# Patient Record
Sex: FEMALE | Race: WHITE | URBAN - METROPOLITAN AREA
[De-identification: names, ages, dates, MRNs, and addresses within clinical notes are randomized per-mention and may not be internally consistent; named-entity substitution may affect disease eponyms.]

---

## 2018-02-19 ENCOUNTER — OFFICE VISIT (OUTPATIENT)
Dept: PSYCHIATRY | Facility: CLINIC | Age: 28
End: 2018-02-19
Attending: NURSE PRACTITIONER
Payer: COMMERCIAL

## 2018-02-19 VITALS — DIASTOLIC BLOOD PRESSURE: 79 MMHG | SYSTOLIC BLOOD PRESSURE: 118 MMHG | WEIGHT: 134.8 LBS | HEART RATE: 90 BPM

## 2018-02-19 DIAGNOSIS — F60.3 BORDERLINE PERSONALITY DISORDER (H): ICD-10-CM

## 2018-02-19 DIAGNOSIS — F43.23 ADJUSTMENT DISORDER WITH MIXED ANXIETY AND DEPRESSED MOOD: Primary | ICD-10-CM

## 2018-02-19 PROCEDURE — G0463 HOSPITAL OUTPT CLINIC VISIT: HCPCS | Mod: ZF

## 2018-02-19 RX ORDER — QUETIAPINE FUMARATE 25 MG/1
25 TABLET, FILM COATED ORAL AT BEDTIME
Qty: 30 TABLET | Refills: 0 | Status: SHIPPED | OUTPATIENT
Start: 2018-02-19 | End: 2018-03-22

## 2018-02-19 RX ORDER — LAMOTRIGINE 25 MG/1
25 TABLET ORAL DAILY
Qty: 30 TABLET | Refills: 0 | Status: SHIPPED | OUTPATIENT
Start: 2018-02-19 | End: 2018-03-22

## 2018-02-19 ASSESSMENT — PAIN SCALES - GENERAL: PAINLEVEL: NO PAIN (0)
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
## 2018-02-19 NOTE — NURSING NOTE
Chief Complaint   Patient presents with     Eval/Assessment     borderline personality disorder     Reviewed allergies, medications, pharmacy and smoking status.  Administered abuse screening questions     Obtained weight, pain level, blood pressure and heart rate

## 2018-02-19 NOTE — MR AVS SNAPSHOT
After Visit Summary   2018    Viky Plaza    MRN: [id number]           Patient Information     Date Of Birth          1990        Visit Information        Provider Department      2018 7:30 AM Viky Sanchez APRN CNP Psychiatry Clinic        Today's Diagnoses     Adjustment disorder with mixed anxiety and depressed mood    -  1    Borderline personality disorder           Follow-ups after your visit        Follow-up notes from your care team     Return in about 4 weeks (around 3/19/2018), or if symptoms worsen or fail to improve, for Routine Visit.      Your next 10 appointments already scheduled     Mar 22, 2018  9:30 AM CDT   Adult Med Follow UP with TIFFANI Luke CNP   Psychiatry Clinic ([facility])    [place] [id number] [facility] [address] [phone]              Who to contact     Please call your clinic at [phone] to:    Ask questions about your health    Make or cancel appointments    Discuss your medicines    Learn about your test results    Speak to your doctor            Additional Information About Your Visit        MyChart Information     Divergence is an electronic gateway that provides easy, online access to your medical records. With Divergence, you can request a clinic appointment, read your test results, renew a prescription or communicate with your care team.     To sign up for [employer] visit the website at [email or web address]   You will be asked to enter the access code listed below, as well as some personal information. Please follow the directions to create your username and password.     Your access code is: [email or web address]  Expires: 2018  2:13 PM     Your access code will  in 90 days. If you need help or a new code, please contact your [facility] or call [phone] for assistance.        Care EveryWhere ID     This is your Care EveryWhere ID. This  could be used by other organizations to access your [facility] medical records  [id number]        Your Vitals Were     Pulse                   90            Blood Pressure from Last 3 Encounters:   02/19/18 118/79    Weight from Last 3 Encounters:   02/19/18 61.1 kg (134 lb 12.8 oz)              Today, you had the following     No orders found for display         Today's Medication Changes          These changes are accurate as of 2/19/18 11:59 PM.  If you have any questions, ask your nurse or doctor.               Start taking these medicines.        Dose/Directions    lamoTRIgine 25 MG tablet   Commonly known as:  LAMICTAL   Used for:  Adjustment disorder with mixed anxiety and depressed mood, Borderline personality disorder   Started by:  Viky Sanchez APRN CNP        Dose:  25 mg   Take 1 tablet (25 mg) by mouth daily   Quantity:  30 tablet   Refills:  0       QUEtiapine 25 MG tablet   Commonly known as:  SEROquel   Used for:  Adjustment disorder with mixed anxiety and depressed mood, Borderline personality disorder   Started by:  Viky Sanchez APRN CNP        Dose:  25 mg   Take 1 tablet (25 mg) by mouth At Bedtime   Quantity:  30 tablet   Refills:  0            Where to get your medicines      These medications were sent to [facility]/pharmacy #[id number] - [place], [place] - [address], [place] [address]     Phone:  [phone]     lamoTRIgine 25 MG tablet    QUEtiapine 25 MG tablet                Primary Care Provider    None Specified       No primary provider on file.        Equal Access to Services     [facility] : Hadleo Castro, waaxda luqadaha, qaybta kaalmada edilberto, moncho hansen. So [facility] [phone].    ATENCIÓN: Si habla español, tiene a shultz disposición servicios gratuitos de asistencia lingüística. Llame al [phone].    We comply with applicable federal civil rights laws and [place] laws.  We do not discriminate on the basis of race, color, national origin, age, disability, sex, sexual orientation, or gender identity.            Thank you!     Thank you for choosing PSYCHIATRY CLINIC  for your care. Our goal is always to provide you with excellent care. Hearing back from our patients is one way we can continue to improve our services. Please take a few minutes to complete the written survey that you may receive in the mail after your visit with us. Thank you!             Your Updated Medication List - Protect others around you: Learn how to safely use, store and throw away your medicines at www.disposemymeds.org.          This list is accurate as of 2/19/18 11:59 PM.  Always use your most recent med list.                   Brand Name Dispense Instructions for use Diagnosis    lamoTRIgine 25 MG tablet    LAMICTAL    30 tablet    Take 1 tablet (25 mg) by mouth daily    Adjustment disorder with mixed anxiety and depressed mood, Borderline personality disorder       QUEtiapine 25 MG tablet    SEROquel    30 tablet    Take 1 tablet (25 mg) by mouth At Bedtime    Adjustment disorder with mixed anxiety and depressed mood, Borderline personality disorder

## 2018-02-20 ASSESSMENT — PATIENT HEALTH QUESTIONNAIRE - PHQ9: SUM OF ALL RESPONSES TO PHQ QUESTIONS 1-9: 9

## 2018-03-22 ENCOUNTER — OFFICE VISIT (OUTPATIENT)
Dept: PSYCHIATRY | Facility: CLINIC | Age: 28
End: 2018-03-22
Attending: NURSE PRACTITIONER
Payer: COMMERCIAL

## 2018-03-22 VITALS — WEIGHT: 132.8 LBS | DIASTOLIC BLOOD PRESSURE: 69 MMHG | SYSTOLIC BLOOD PRESSURE: 112 MMHG | HEART RATE: 98 BPM

## 2018-03-22 DIAGNOSIS — F43.23 ADJUSTMENT DISORDER WITH MIXED ANXIETY AND DEPRESSED MOOD: ICD-10-CM

## 2018-03-22 DIAGNOSIS — F60.3 BORDERLINE PERSONALITY DISORDER (H): ICD-10-CM

## 2018-03-22 PROCEDURE — G0463 HOSPITAL OUTPT CLINIC VISIT: HCPCS | Mod: ZF

## 2018-03-22 RX ORDER — LAMOTRIGINE 25 MG/1
25 TABLET ORAL DAILY
Qty: 30 TABLET | Refills: 1 | Status: SHIPPED | OUTPATIENT
Start: 2018-03-22 | End: 2018-05-15

## 2018-03-22 RX ORDER — QUETIAPINE FUMARATE 25 MG/1
25 TABLET, FILM COATED ORAL AT BEDTIME
Qty: 30 TABLET | Refills: 1 | Status: SHIPPED | OUTPATIENT
Start: 2018-03-22 | End: 2018-05-15

## 2018-03-22 ASSESSMENT — PAIN SCALES - GENERAL: PAINLEVEL: NO PAIN (0)

## 2018-03-22 NOTE — PROGRESS NOTES
Outpatient Psychiatry Progress Note     Provider: TIFFANI Castaneda CNP  Date: 3/22/2018  Service:  Medication management.   Patient Identification: Viky Plaza  : 1990   MRN: [id number]    Viky Plaza is a 27 year old female who presents for ongoing psychiatric care.  iVky was last seen in clinic on 18 for a psych eval. At that time, she chose to continue Lamictal 25mg daily with Seroquel 25mg QHS PRN.    3/22/2018  Today Viky reports she is doing OK.  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 PhD  - she didn't have much of a break for spring break while taking care of her MGM but feels good she was there for support  - was able to see her ranger friend in [place], felt she was supporting him too in his depressive episode  - recovering from recent virus, lab work revealed she is anemic  - missed exercising while sick and missed this healthy coping skill    Compliance: daily  Side effects: denies    Psychiatric Review of Systems:  Depression: feels OK  Anxiety: anxious about recent virus and anemia, mildly elevated iron  - pulls hair out when stressed  - feels tense and keyed up but her emotional awareness reduces her anxiety    Lethality: denies    Review of Medical Systems:  Appetite: OK, weight stable within 5#  Sleep: sleeping well, averaging 9 hours overnight  Self Care: enjoys rock climbing, reading, working on a puzzle  Housework and hygiene: managing normally  Energy: intact  Concentration: intact    Current Substance Use:    Social History     Social History     Marital status: Single     Spouse name: N/A     Number of children: N/A     Years of education: N/A     Social History Main Topics     Smoking status: Never Smoker     Smokeless  "tobacco: Never Used     Alcohol use Not on file     Drug use: Not on file     Sexual activity: Not on file     Other Topics Concern     Not on file     Social History Narrative     No narrative on file     Drinks socially, limits black tea    Past Medical History: No past medical history on file.    Medical health update: recent labs    Allergies:   Allergies   Allergen Reactions     Erythromycin      Sulfa Drugs         Current Medications     Current Outpatient Prescriptions Ordered in [facility]   Medication Sig Dispense Refill     lamoTRIgine (LAMICTAL) 25 MG tablet Take 1 tablet (25 mg) by mouth daily 30 tablet 0     QUEtiapine (SEROQUEL) 25 MG tablet Take 1 tablet (25 mg) by mouth At Bedtime 30 tablet 0     No current Epic-ordered facility-administered medications on file.       Mental Status Exam     Appearance:  Casually dressed, Adequately groomed, Dressed appropriately for weather and Appears stated age  Behavior/relationship to examiner/demeanor:  Cooperative, Engaged and Pleasant  Orientation: Oriented to person, place, time and situation  Psychomotor: WNL  Speech Rate:  Normal  Speech Spontaneity:  Normal  Mood:  \"good\"  Affect:  Appropriate/mood-congruent and Bright  Thought Process (Associations):  Logical, Linear and Goal directed  Thought Content:  no evidence of suicidal or homicidal ideation, no overt psychosis, denies suicidal ideation, intent or thoughts, patient denies auditory and visual hallucinations, patient does not appear to be responding to internal stimuli and denies suicidal intent or plan  Abnormal Perception:  None  Attention/Concentration:  Normal  Language:  Intact  Insight:  Fair and Limited  Judgment:  Good      Results     Vital signs: There were no vitals taken for this visit.    Laboratory Data: No results found for: WBC, HGB, HCT, PLT, CHOL, TRIG, HDL, ALT, AST, NA, CREATININE, BUN, CO2, TSH, PSA, INR, GLUF    Assessment & Plan     Viky is seen today for follow up.  - reports she " pulls out her hair when anxious       Diagnosis  Axis 1, 2: Borderline PD, adjustment disorder with mixed anxiety and depression     Plan:   Medication: continue lamotrigine 25mg daily, Seroquel 25mg QHS PRN  OTC Recommendations: none  Lab Orders: none  Referrals: stopped seeing EAP therapist, considers seeing someone near her part of campus  Release of Information: none  Future Treatment Considerations: therapy  Return for Follow Up: 4 weeks, sooner as needed    She voices understanding of the treatment plan including discussion of options, risks/ benefits. She has clinic contact information and will seek emergency services if urgent or life threatening symptoms present. Viky understands risks associated with drug and alcohol use.     Visit was spent counseling the patient and/or coordinating care regarding review of social and occupational functioning.  In addition patient was counseled on health and wellness practices to augment medication treatment of symptoms. See note for details.    PHQ-9 score:  0  PHQ-9 SCORE 2/19/2018   Total Score 9     GAD7: No flowsheet data found.  : 02/2018  Viky Sanchez, TIFFANI CNP 3/22/2018

## 2018-03-22 NOTE — NURSING NOTE
Chief Complaint   Patient presents with     Recheck Medication     Adjustment disorder with mixed anxiety and depressed mood      Reviewed Allergies, Medications, Pharmacy, Smoking Status, and Pain Level     Obtained Weight, Blood Pressure, Heart Rate

## 2018-05-15 ENCOUNTER — OFFICE VISIT (OUTPATIENT)
Dept: PSYCHIATRY | Facility: CLINIC | Age: 28
End: 2018-05-15
Attending: NURSE PRACTITIONER
Payer: COMMERCIAL

## 2018-05-15 VITALS — WEIGHT: 126.2 LBS | DIASTOLIC BLOOD PRESSURE: 81 MMHG | HEART RATE: 86 BPM | SYSTOLIC BLOOD PRESSURE: 119 MMHG

## 2018-05-15 DIAGNOSIS — F43.23 ADJUSTMENT DISORDER WITH MIXED ANXIETY AND DEPRESSED MOOD: ICD-10-CM

## 2018-05-15 DIAGNOSIS — F60.3 BORDERLINE PERSONALITY DISORDER (H): ICD-10-CM

## 2018-05-15 PROCEDURE — G0463 HOSPITAL OUTPT CLINIC VISIT: HCPCS | Mod: ZF

## 2018-05-15 RX ORDER — QUETIAPINE FUMARATE 25 MG/1
25 TABLET, FILM COATED ORAL AT BEDTIME
Qty: 30 TABLET | Refills: 2 | Status: SHIPPED | OUTPATIENT
Start: 2018-05-15

## 2018-05-15 RX ORDER — LAMOTRIGINE 25 MG/1
25 TABLET ORAL DAILY
Qty: 30 TABLET | Refills: 2 | Status: SHIPPED | OUTPATIENT
Start: 2018-05-15

## 2018-05-15 ASSESSMENT — PAIN SCALES - GENERAL: PAINLEVEL: NO PAIN (0)

## 2018-05-15 NOTE — PROGRESS NOTES
"  Outpatient Psychiatry Progress Note     Provider: TIFFANI Castaneda CNP  Date: 5/15/2018  Service:  Medication management.   Patient Identification: Viky Plaza  : 1990   MRN: [id number]    Viky Plaza is a 28 year old female who presents for ongoing psychiatric care.  Viky was last seen in clinic on 3/22/2018. At that time, she chose to continue lamotrigine 25mg daily, Seroquel 25mg QHS PRN.    5/15/2018  Today Viky reports she's stressed.  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  - she was offered to stay at [employer] instead of returning to [place]  - two TA opportunities fell through at [place] but her professor offered her funding for the summer this morning  - she was contacted by [employer] to work as a ranger as her \"dream job\"  - discussed possibilities that she could be a  whose passion is hiking and being in the outdoors  - if she decides to proceed with [place], she has 2 weeks to move back to [place]    Compliance: daily  Side effects: denies    Psychiatric Review of Systems:  Depression: improved, reports indecisiveness and racing, perseverative thoughts before sleep  Anxiety: anxious about her career options    Lethality: denies    Review of Medical Systems:  Appetite: low, 6# weight loss; reports her appetite and weight drops when she's stressed  Sleep: staying awake trying to think about career options, sleeping well once asleep for 7-8 hours vs the 9 hours she prefers  Self Care: encouraged, enjoys being outdoors and reading  Housework and hygiene: managing normally  Energy: intact  Concentration: intact    Current Substance Use:    Social History     Social History     Marital status: Single     Spouse name: N/A     Number of children: N/A     Years of education: N/A " "    Social History Main Topics     Smoking status: Never Smoker     Smokeless tobacco: Never Used     Alcohol use None     Drug use: None     Sexual activity: Not Asked     Other Topics Concern     None     Social History Narrative     Drinks socially, limits tea.    Past Medical History: No past medical history on file.    Medical health update: none today    Allergies:   Allergies   Allergen Reactions     Erythromycin      Sulfa Drugs         Current Medications     Current Outpatient Prescriptions Ordered in [facility]   Medication Sig Dispense Refill     lamoTRIgine (LAMICTAL) 25 MG tablet Take 1 tablet (25 mg) by mouth daily 30 tablet 1     QUEtiapine (SEROQUEL) 25 MG tablet Take 1 tablet (25 mg) by mouth At Bedtime 30 tablet 1     No current Epic-ordered facility-administered medications on file.       Mental Status Exam     Appearance:  Casually dressed, Adequately groomed, Dressed appropriately for weather and Appears stated age  Behavior/relationship to examiner/demeanor:  Cooperative, Engaged and Pleasant  Orientation: Oriented to person, place, time and situation  Psychomotor: WNL  Speech Rate:  Normal  Speech Spontaneity:  Normal  Mood:  \"okay\" and worried  Affect:  Appropriate/mood-congruent  Thought Process (Associations):  Goal directed  Thought Content:  no evidence of suicidal or homicidal ideation, no overt psychosis, denies suicidal ideation, intent or thoughts, patient denies auditory and visual hallucinations, patient does not appear to be responding to internal stimuli and denies suicidal intent or plan  Abnormal Perception:  None  Attention/Concentration:  Normal  Language:  Intact  Insight:  Fair  Judgment:  Good      Results     Vital signs: /81  Pulse 86  Wt 57.2 kg (126 lb 3.2 oz)    Laboratory Data: No results found for: WBC, HGB, HCT, PLT, CHOL, TRIG, HDL, ALT, AST, NA, CREATININE, BUN, CO2, TSH, PSA, INR, GLUF    Assessment & Plan     Viky is seen today for follow up.  - has reported " pulling out her hair when anxious        Diagnosis  Axis 1, 2: Borderline PD, adjustment disorder with mixed anxiety and depression      Plan:   Medication: continue lamotrigine 25mg daily, Seroquel 25mg QHS PRN  OTC Recommendations: none  Lab Orders: none  Referrals: stopped seeing EAP therapist, considers seeing someone near her part of campus  Release of Information: none  Future Treatment Considerations: therapy  Return for Follow Up: 12 weeks, sooner as needed     She voices understanding of the treatment plan including discussion of options, risks/ benefits. She has clinic contact information and will seek emergency services if urgent or life threatening symptoms present. Viky understands risks associated with drug and alcohol use.     Visit was spent counseling the patient and/or coordinating care regarding review of social and occupational functioning.  In addition patient was counseled on health and wellness practices to augment medication treatment of symptoms. See note for details.    PHQ-9 score:  12  PHQ-9 SCORE 2/19/2018   Total Score 9     GAD7: No flowsheet data found.  : 2/2018  Viky Sanchez, TIFFANI CNP 5/15/2018

## 2018-05-15 NOTE — NURSING NOTE
Chief Complaint   Patient presents with     Recheck Medication     Adjustment disorder with mixed anxiety and depressed mood

## 2018-05-15 NOTE — MR AVS SNAPSHOT
After Visit Summary   5/15/2018    Viky Plaza    MRN: [id number]           Patient Information     Date Of Birth          1990        Visit Information        Provider Department      5/15/2018 1:00 PM Viky Sanchez, TIFFANI [facility]        Today's Diagnoses     Adjustment disorder with mixed anxiety and depressed mood        Borderline personality disorder           Follow-ups after your visit        Follow-up notes from your care team     Return if symptoms worsen or fail to improve, for Routine Visit.      Who to contact     Please call your clinic at [phone] to:    Ask questions about your health    Make or cancel appointments    Discuss your medicines    Learn about your test results    Speak to your doctor            Additional Information About Your Visit        MyChart Information     [employer] is an electronic gateway that provides easy, online access to your medical records. With [employer], you can request a clinic appointment, read your test results, renew a prescription or communicate with your care team.     To sign up for [facility] [employer] visit the website at [email or web address]   You will be asked to enter the access code listed below, as well as some personal information. Please follow the directions to create your username and password.     Your access code is: [email or web address]  Expires: 2018  3:13 PM     Your access code will  in 90 days. If you need help or a new code, please contact your [facility] or call [phone] for assistance.        Care EveryWhere ID     This is your Care EveryWhere ID. This could be used by other organizations to access your [facility] medical records  [id number]        Your Vitals Were     Pulse                   86            Blood Pressure from Last 3 Encounters:   05/15/18 119/81   18 112/69   18 118/79    Weight from Last 3 Encounters:   05/15/18 57.2 kg (126 lb 3.2 oz)   18  60.2 kg (132 lb 12.8 oz)   02/19/18 61.1 kg (134 lb 12.8 oz)              Today, you had the following     No orders found for display         Where to get your medicines      Some of these will need a paper prescription and others can be bought over the counter.  Ask your nurse if you have questions.     Bring a paper prescription for each of these medications     lamoTRIgine 25 MG tablet    QUEtiapine 25 MG tablet          Primary Care Provider Office Phone # Fax #    [facility], [phone]       [address] [place] [id number]        Equal Access to Services     [facility]: Hadii amber ku hadasho Soomaali, waaxda luqadaha, qaybta kaalmada edilberto, moncho pantoja . So [facility] [phone].    ATENCIÓN: Si habla español, tiene a shultz disposición servicios gratuitos de asistencia lingüística. [facility] [phone].    We comply with applicable federal civil rights laws and [place] laws. We do not discriminate on the basis of race, color, national origin, age, disability, sex, sexual orientation, or gender identity.            Thank you!     Thank you for choosing PS[facility] CLINIC  for your care. Our goal is always to provide you with excellent care. Hearing back from our patients is one way we can continue to improve our services. Please take a few minutes to complete the written survey that you may receive in the mail after your visit with us. Thank you!             Your Updated Medication List - Protect others around you: Learn how to safely use, store and throw away your medicines at www.disposemymeds.org.          This list is accurate as of 5/15/18 11:59 PM.  Always use your most recent med list.                   Brand Name Dispense Instructions for use Diagnosis    lamoTRIgine 25 MG tablet    LaMICtal    30 tablet    Take 1 tablet (25 mg) by mouth daily    Adjustment disorder with mixed anxiety and depressed mood, Borderline personality disorder        QUEtiapine 25 MG tablet    SEROquel    30 tablet    Take 1 tablet (25 mg) by mouth At Bedtime    Adjustment disorder with mixed anxiety and depressed mood, Borderline personality disorder

## 2018-05-17 ENCOUNTER — NURSE TRIAGE (OUTPATIENT)
Dept: NURSING | Facility: CLINIC | Age: 28
End: 2018-05-17

## 2018-05-17 ASSESSMENT — PATIENT HEALTH QUESTIONNAIRE - PHQ9: SUM OF ALL RESPONSES TO PHQ QUESTIONS 1-9: 12

## 2018-05-17 NOTE — TELEPHONE ENCOUNTER
"Patient calling stating \"I think I have a bladder infection.\" Symptoms starting 5/16/18. Urinary frequency, dysuria. Afebrile.  Per guidelines below advised patient to be seen with in 24 hours. Caller verbalized understanding. Denies further questions.    Sobia Johnson RN  [facility]      Reason for Disposition    All other patients with painful urination(Exception: [1] EITHER frequency or urgency AND [2] has on-call doctor)    Additional Information    Negative: Shock suspected (e.g., cold/pale/clammy skin, too weak to stand, low BP, rapid pulse)    Negative: Sounds like a life-threatening emergency to the triager    Negative: Followed a genital area injury    Negative: Taking antibiotic for urinary tract infection (UTI)    Negative: Pregnant    Negative: Postpartum < 1 month    Negative: [1] Unable to urinate (or only a few drops) > 4 hours AND     [2] bladder feels very full (e.g., palpable bladder or strong urge to urinate)    Negative: Patient sounds very sick or weak to the triager    Negative: [1] SEVERE pain with urination  (e.g., excruciating) AND [2] not improved after 2 hours of pain medicine and Sitz bath    Negative: Fever > 100.5 F (38.1 C)    Negative: Side (flank) or lower back pain present    Negative: Diabetes mellitus or weak immune system (e.g., HIV positive, cancer chemotherapy, transplant patient)    Negative: Bedridden (e.g., nursing home patient, CVA, chronic illness, recovering from surgery)    Negative: Artificial heart valve or artificial joint    Negative: Unusual vaginal discharge (e.g., bad smelling, yellow, green, or foamy-white)    Negative: Patient is worried about sexually transmitted disease (STD)    Negative: Possibility of pregnancy    Negative: Blood in urine (red, pink, or tea-colored)    Negative: Age > 50 years    Negative: > 2 UTI's in last year    Protocols used: URINATION PAIN - FEMALE-ADULT-    "

## 2018-05-20 ENCOUNTER — NURSE TRIAGE (OUTPATIENT)
Dept: NURSING | Facility: CLINIC | Age: 28
End: 2018-05-20

## 2018-05-20 NOTE — TELEPHONE ENCOUNTER
Viky has a bladder infection and is on antibiotics and now has a rash on middle of back now.  Viky is taking Nitrofuratoin.   Viky saw a nurse practitioner at [facility].    FNA advised benadryl and hctz cream 1% and to contact clinic tomorrow.

## 2018-05-21 ENCOUNTER — TELEPHONE (OUTPATIENT)
Dept: PSYCHIATRY | Facility: CLINIC | Age: 28
End: 2018-05-21

## 2018-05-21 NOTE — TELEPHONE ENCOUNTER
Received a call from patient who was crying and highly upset.  She reported that she had a UTI that did not respond to the first antibiotic she was given, and she was prescribed Cipro today.  However, her doctor and pharmacist both told her that she should stop taking quetiapine while on Cipro.  Per [employer], ciproflaxin and quetiapine have a major interaction of potentail QT prolongation.    She is on 25 mg PO Q HS, and she said that she only uses it for sleep, but she is upset because she needs to be able to sleep.  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  She is also upset at the timing of the UTI, which is hampering her ability to do what she needs to do to get ready for her trip.    She has taken Benadryl for sleep in the past, and suggested taking this tonight instead of Seroquel.  She said that it has been effective for her before, and will see if it helps her with sleep tonight.  She would still like to know though whether she can switch back to Seroquel even before she finishes her 10 day course of Cipro.  She denied any history of cardiac issues.    Will route to TIFFANI Carmen, for recommendations.

## 2018-05-22 NOTE — TELEPHONE ENCOUNTER
-Received incoming phone call from pt stating the Benadryl was ineffective last night. She is wondering if she can try something else  -Routed to provider and marked as urgent as pt would like a call within the next hour. She will be busy later this evening.

## 2018-05-22 NOTE — TELEPHONE ENCOUNTER
Message  Received: Yesterday       Viky Sanchez, Alexandra Mascorro CNP, RN       Caller: Unspecified (Yesterday,  3:56 PM)                     FYI- called Viky back, left . Invited a call back as needed.     Advised her to avoid Seroquel and Cipro as PCP and pharmacist stated.     Viky

## 2018-05-22 NOTE — TELEPHONE ENCOUNTER
Message  Received: Today       Viky Sanchez, Alexandra Mascorro CNP, RN       Caller: Unspecified (Yesterday,  3:56 PM)                     Spoke with patient. She'll try Unisom OTC if retrial of Benadryl is ineffective tonight. She knows she can tell pharmacist I made this recommendation and seek their expertise too.     - Hydroxyzine has same risk of prolonged QT as Seroquel.     Viky

## 2018-07-12 ENCOUNTER — TELEPHONE (OUTPATIENT)
Dept: OTHER | Facility: CLINIC | Age: 28
End: 2018-07-12